# Patient Record
Sex: MALE | Race: WHITE
[De-identification: names, ages, dates, MRNs, and addresses within clinical notes are randomized per-mention and may not be internally consistent; named-entity substitution may affect disease eponyms.]

---

## 2019-04-01 ENCOUNTER — HOSPITAL ENCOUNTER (EMERGENCY)
Dept: HOSPITAL 89 - ER | Age: 19
Discharge: HOME | End: 2019-04-01
Payer: COMMERCIAL

## 2019-04-01 VITALS — DIASTOLIC BLOOD PRESSURE: 79 MMHG | SYSTOLIC BLOOD PRESSURE: 138 MMHG
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
## 2019-04-01 DIAGNOSIS — S01.111A: Primary | ICD-10-CM

## 2019-04-01 PROCEDURE — 99283 EMERGENCY DEPT VISIT LOW MDM: CPT

## 2019-04-01 NOTE — ER REPORT
History and Physical


Time Seen By MD:  01:23


Hx. of Stated Complaint:  


pt was longboarding. hit head on curb. lac to right forehead


HPI/ROS


CHIEF COMPLAINT: Laceration right eyebrow





HISTORY OF PRESENT ILLNESS: This is a 18-year-old male. He fell while long 


boarding. His right eyebrow area on the curb. No loss of consciousness. He has 


pain over the laceration but no other headache. No dizziness. Normal vision. 


Up-to-date on immunizations.


Allergies:  


Coded Allergies:  


     No Known Drug Allergies (Unverified , 4/1/19)


Reviewed Nurses Notes:  Yes


Constitutional





Vital Sign - Last 24 Hours








 4/1/19





 01:27


 


Temp 98.0


 


Pulse 57


 


Resp 16


 


B/P (MAP) 138/79


 


Pulse Ox 96


 


O2 Delivery Room Air








Physical Exam


  General Appearance: Alert, no acute distress.[ ]


Eyes: Pupils are round, reactive to light, equal, extraocular movements intact, 


normal sclera.


ENT: Normal oral mucosa.


Neuro: Alert and oriented 4, no focal deficits.


Musculoskeletal: No neck pain.


Skin: 2 cm laceration lateral side of the right eyebrow.





DIFFERENTIAL DIAGNOSIS: After history and physical exam differential diagnosis 


was considered for laceration from head injury, no sign of concussion or other 


neurologic





Medical Decision Making


ED Course/Re-evaluation


ED Course





Procedure: Laceration Repair





Verbal consent from patient after discussing repair options, risks and benefits.


Wound cleaned extensively with Hibiclens and saline.


Anesthesia: 1% lidocaine with epinephrine and 0.5% bupivacaine.


Location: Right eyebrow laterally. 


Length: 2 cm.


Character: Linear.  


Wound repair: 4 interrupted 4-0 Ethilon sutures.  


The wound repair was simple and performed by myself.





Wound care instructions discussed.


Sutures need to be removed in 7 days.


Decision to Disposition Date:  Apr 1, 2019


Decision to Disposition Time:  02:06





Depart


Departure


Latest Vital Signs





Vital Signs








  Date Time  Temp Pulse Resp B/P (MAP) Pulse Ox O2 Delivery O2 Flow Rate FiO2


 


4/1/19 01:27 98.0 57 16 138/79 96 Room Air  








Impression:  


   Primary Impression:  


   Eyebrow laceration


Condition:  Improved


Disposition:  HOME OR SELF-CARE


Patient Instructions:  Laceration (ED)





Additional Instructions:  


Wound Care: Wash the wound once a day with soap and water. Dry the wound and 


apply a small amount of antibiotic ointment with a clean dressing. If the 


dressing becomes wet or dirty, repeat cleaning and dressing as above. No soaking


the wound; no swimming.


Stitches need to be removed in 5-7 days.


Pain Control: Use Tylenol or ibuprofen for pain. Using and ice pack can help 


reduce swelling.





Problem Qualifiers








   Primary Impression:  


   Eyebrow laceration


   Encounter type:  initial encounter  Laterality:  right  Qualified Codes:  


   S01.111A - Laceration without foreign body of right eyelid and periocular 


   area, initial encounter








ZBIGNIEW TENORIO MD              Apr 1, 2019 01:35